# Patient Record
Sex: FEMALE | Race: WHITE
[De-identification: names, ages, dates, MRNs, and addresses within clinical notes are randomized per-mention and may not be internally consistent; named-entity substitution may affect disease eponyms.]

---

## 2021-01-19 ENCOUNTER — HOSPITAL ENCOUNTER (EMERGENCY)
Dept: HOSPITAL 63 - ER | Age: 71
Discharge: HOME | End: 2021-01-19
Payer: COMMERCIAL

## 2021-01-19 VITALS — BODY MASS INDEX: 27.48 KG/M2 | HEIGHT: 60 IN | WEIGHT: 139.99 LBS

## 2021-01-19 VITALS — DIASTOLIC BLOOD PRESSURE: 76 MMHG | SYSTOLIC BLOOD PRESSURE: 141 MMHG

## 2021-01-19 DIAGNOSIS — Z90.710: ICD-10-CM

## 2021-01-19 DIAGNOSIS — Z85.89: ICD-10-CM

## 2021-01-19 DIAGNOSIS — R10.32: Primary | ICD-10-CM

## 2021-01-19 DIAGNOSIS — Z90.49: ICD-10-CM

## 2021-01-19 DIAGNOSIS — Z88.2: ICD-10-CM

## 2021-01-19 DIAGNOSIS — R19.04: ICD-10-CM

## 2021-01-19 DIAGNOSIS — Z98.890: ICD-10-CM

## 2021-01-19 LAB
ALBUMIN SERPL-MCNC: 3.9 G/DL (ref 3.4–5)
ALBUMIN/GLOB SERPL: 1.1 {RATIO} (ref 1–1.7)
ALP SERPL-CCNC: 56 U/L (ref 46–116)
ALT SERPL-CCNC: 17 U/L (ref 14–59)
ANION GAP SERPL CALC-SCNC: 10 MMOL/L (ref 6–14)
APTT PPP: (no result) S
AST SERPL-CCNC: 14 U/L (ref 15–37)
BACTERIA #/AREA URNS HPF: (no result) /HPF
BASOPHILS # BLD AUTO: 0 X10^3/UL (ref 0–0.2)
BASOPHILS NFR BLD: 1 % (ref 0–3)
BILIRUB SERPL-MCNC: 0.5 MG/DL (ref 0.2–1)
BILIRUB UR QL STRIP: (no result)
BUN/CREAT SERPL: 19 (ref 6–20)
CA-I SERPL ISE-MCNC: 13 MG/DL (ref 7–20)
CALCIUM SERPL-MCNC: 9.1 MG/DL (ref 8.5–10.1)
CHLORIDE SERPL-SCNC: 102 MMOL/L (ref 98–107)
CO2 SERPL-SCNC: 26 MMOL/L (ref 21–32)
CREAT SERPL-MCNC: 0.7 MG/DL (ref 0.6–1)
EOSINOPHIL NFR BLD: 0 % (ref 0–3)
EOSINOPHIL NFR BLD: 0 X10^3/UL (ref 0–0.7)
ERYTHROCYTE [DISTWIDTH] IN BLOOD BY AUTOMATED COUNT: 13.7 % (ref 11.5–14.5)
FIBRINOGEN PPP-MCNC: CLEAR MG/DL
GFR SERPLBLD BASED ON 1.73 SQ M-ARVRAT: 83.2 ML/MIN
GLOBULIN SER-MCNC: 3.7 G/DL (ref 2.2–3.8)
GLUCOSE SERPL-MCNC: 109 MG/DL (ref 70–99)
GLUCOSE UR STRIP-MCNC: (no result) MG/DL
HCT VFR BLD CALC: 39.1 % (ref 36–47)
HGB BLD-MCNC: 13.1 G/DL (ref 12–15.5)
LIPASE: 63 U/L (ref 73–393)
LYMPHOCYTES # BLD: 2 X10^3/UL (ref 1–4.8)
LYMPHOCYTES NFR BLD AUTO: 26 % (ref 24–48)
MCH RBC QN AUTO: 29 PG (ref 25–35)
MCHC RBC AUTO-ENTMCNC: 34 G/DL (ref 31–37)
MCV RBC AUTO: 85 FL (ref 79–100)
MONO #: 0.6 X10^3/UL (ref 0–1.1)
MONOCYTES NFR BLD: 8 % (ref 0–9)
NEUT #: 5 X10^3UL (ref 1.8–7.7)
NEUTROPHILS NFR BLD AUTO: 65 % (ref 31–73)
NITRITE UR QL STRIP: (no result)
PLATELET # BLD AUTO: 335 X10^3/UL (ref 140–400)
POTASSIUM SERPL-SCNC: 3.9 MMOL/L (ref 3.5–5.1)
PROT SERPL-MCNC: 7.6 G/DL (ref 6.4–8.2)
RBC # BLD AUTO: 4.57 X10^6/UL (ref 3.5–5.4)
RBC #/AREA URNS HPF: (no result) /HPF (ref 0–2)
SODIUM SERPL-SCNC: 138 MMOL/L (ref 136–145)
SP GR UR STRIP: <=1.005
SQUAMOUS #/AREA URNS LPF: (no result) /LPF
UROBILINOGEN UR-MCNC: 0.2 MG/DL
WBC # BLD AUTO: 7.7 X10^3/UL (ref 4–11)
WBC #/AREA URNS HPF: (no result) /HPF (ref 0–4)

## 2021-01-19 PROCEDURE — 96360 HYDRATION IV INFUSION INIT: CPT

## 2021-01-19 PROCEDURE — 80053 COMPREHEN METABOLIC PANEL: CPT

## 2021-01-19 PROCEDURE — 36415 COLL VENOUS BLD VENIPUNCTURE: CPT

## 2021-01-19 PROCEDURE — 81001 URINALYSIS AUTO W/SCOPE: CPT

## 2021-01-19 PROCEDURE — 83690 ASSAY OF LIPASE: CPT

## 2021-01-19 PROCEDURE — 99285 EMERGENCY DEPT VISIT HI MDM: CPT

## 2021-01-19 PROCEDURE — 83605 ASSAY OF LACTIC ACID: CPT

## 2021-01-19 PROCEDURE — 85025 COMPLETE CBC W/AUTO DIFF WBC: CPT

## 2021-01-19 PROCEDURE — 74177 CT ABD & PELVIS W/CONTRAST: CPT

## 2021-01-19 NOTE — PHYS DOC
Past History


Past Medical History:  Other


Additional Past Medical Histor:  SEASONAL ALLERGIES


Past Surgical History:  Cholecystectomy, , Hysterectomy, Tonsillectomy,

Other


Additional Past Surgical Histo:  WISDOM TEETH


Alcohol Use:  Occasionally





Adult General


Chief Complaint


Chief Complaint:  ABDOMINAL PAIN





HPI


HPI





Patient is a 70-year-old female with a past medical history significant for 

endometrial cancer who presents to the emergency department via the urgent care 

for left lower quadrant abdominal pain and abnormal CT with mass in left lower 

quadrant.  Patient states she has had some left lower quadrant fullness and 

tenderness for several weeks now.  States that she went to an urgent care today,

had a CT scan and a mass was seen on CT and was directed to the ED.  States that

she has intermittent pain in the area, 5 out of 10, dull and achy in nature and 

feels like there is something they are pressing on her bladder and bowels.  De

nies any recent traumas, illnesses, fevers, chest pain, shortness of breath, 

nausea, vomiting, diarrhea, dysuria, hematuria or blood in the stool.





Review of Systems


Review of Systems


Review of systems otherwise unremarkable except what noted in HPI





Current Medications


Current Medications





Current Medications








 Medications


  (Trade)  Dose


 Ordered  Sig/Lexie  Start Time


 Stop Time Status Last Admin


Dose Admin


 


 Iohexol


  (Omnipaque 300


 Mg/ml)  75 ml  1X  ONCE  21 19:45


 21 19:59 DC 21 20:38


75 ML


 


 Lactated Ringer's  1,000 ml @ 


 1,000 mls/hr  1X  ONCE  21 21:15


 21 22:14  21 21:19


1,000 MLS/HR











Allergies


Allergies





Allergies








Coded Allergies Type Severity Reaction Last Updated Verified


 


  Sulfa (Sulfonamide Antibiotics) Allergy Unknown  21 Yes











Physical Exam


Physical Exam





Constitutional: Well developed, well nourished, no acute distress, non-toxic 

appearance. []


HENT: Normocephalic, atraumatic, oropharynx moist, no oral exudates, nose 

normal. []


Eyes: conjunctiva normal, no discharge. [] 


Neck: Normal range of motion, 


Cardiovascular:Heart rate regular rhythm, no murmur []


Lungs & Thorax:  Bilateral breath sounds clear to auscultation []


Abdomen: Bowel sounds normal, obvious firm mass in left lower quadrant that is 

tender to palpation.  The rest of the abdominal exam with no tenderness, rebound


Skin: Warm, dry, no erythema, no rash. [] 


Back: No tenderness, no CVA tenderness. [] 


Extremities: No tenderness, no cyanosis, no clubbing, ROM intact, no edema. [] 


Neurologic: Alert and oriented X 3, normal motor function, normal sensory 

function, no focal deficits noted. []


Psychologic: Affect normal, judgement normal, mood normal. []





Current Patient Data


Vital Signs





                                   Vital Signs








  Date Time  Temp Pulse Resp B/P (MAP) Pulse Ox O2 Delivery O2 Flow Rate FiO2


 


21 19:25 98.2 84 18 148/70 (96) 96 Room Air  








Lab Results





                                Laboratory Tests








Test


 21


19:34 21


20:00


 


Urine Collection Type Unknown   


 


Urine Color Straw   


 


Urine Clarity Clear   


 


Urine pH 5.5   


 


Urine Specific Gravity <=1.005   


 


Urine Protein


 Neg


(NEG-TRACE) 





 


Urine Glucose (UA)


 Neg mg/dL


(NEG) 





 


Urine Ketones (Stick)


 Neg mg/dL


(NEG) 





 


Urine Blood Small (NEG)   


 


Urine Nitrite Neg (NEG)   


 


Urine Bilirubin Neg (NEG)   


 


Urine Urobilinogen Dipstick


 0.2 mg/dL (0.2


mg/dL) 





 


Urine Leukocyte Esterase Neg (NEG)   


 


Urine RBC


 Occ /HPF (0-2)


 





 


Urine WBC


 Occ /HPF (0-4)


 





 


Urine Squamous Epithelial


Cells Occ /LPF  


 





 


Urine Bacteria


 Few /HPF


(0-FEW) 





 


White Blood Count


 


 7.7 x10^3/uL


(4.0-11.0)


 


Red Blood Count


 


 4.57 x10^6/uL


(3.50-5.40)


 


Hemoglobin


 


 13.1 g/dL


(12.0-15.5)


 


Hematocrit


 


 39.1 %


(36.0-47.0)


 


Mean Corpuscular Volume


 


 85 fL ()





 


Mean Corpuscular Hemoglobin  29 pg (25-35)  


 


Mean Corpuscular Hemoglobin


Concent 


 34 g/dL


(31-37)


 


Red Cell Distribution Width


 


 13.7 %


(11.5-14.5)


 


Platelet Count


 


 335 x10^3/uL


(140-400)


 


Neutrophils (%) (Auto)  65 % (31-73)  


 


Lymphocytes (%) (Auto)  26 % (24-48)  


 


Monocytes (%) (Auto)  8 % (0-9)  


 


Eosinophils (%) (Auto)  0 % (0-3)  


 


Basophils (%) (Auto)  1 % (0-3)  


 


Neutrophils # (Auto)


 


 5.0 x10^3uL


(1.8-7.7)


 


Lymphocytes # (Auto)


 


 2.0 x10^3/uL


(1.0-4.8)


 


Monocytes # (Auto)


 


 0.6 x10^3/uL


(0.0-1.1)


 


Eosinophils # (Auto)


 


 0.0 x10^3/uL


(0.0-0.7)


 


Basophils # (Auto)


 


 0.0 x10^3/uL


(0.0-0.2)


 


Sodium Level


 


 138 mmol/L


(136-145)


 


Potassium Level


 


 3.9 mmol/L


(3.5-5.1)


 


Chloride Level


 


 102 mmol/L


()


 


Carbon Dioxide Level


 


 26 mmol/L


(21-32)


 


Anion Gap  10 (6-14)  


 


Blood Urea Nitrogen


 


 13 mg/dL


(7-20)


 


Creatinine


 


 0.7 mg/dL


(0.6-1.0)


 


Estimated GFR


(Cockcroft-Gault) 


 83.2  





 


BUN/Creatinine Ratio  19 (6-20)  


 


Glucose Level


 


 109 mg/dL


(70-99)  H


 


Lactic Acid Level


 


 0.7 mmol/L


(0.4-2.0)


 


Calcium Level


 


 9.1 mg/dL


(8.5-10.1)


 


Total Bilirubin


 


 0.5 mg/dL


(0.2-1.0)


 


Aspartate Amino Transferase


(AST) 


 14 U/L (15-37)


L


 


Alanine Aminotransferase (ALT)


 


 17 U/L (14-59)





 


Alkaline Phosphatase


 


 56 U/L


()


 


Total Protein


 


 7.6 g/dL


(6.4-8.2)


 


Albumin


 


 3.9 g/dL


(3.4-5.0)


 


Albumin/Globulin Ratio  1.1 (1.0-1.7)  


 


Lipase


 


 63 U/L


()  L











EKG


EKG


[]





Radiology/Procedures


Radiology/Procedures


[]





Heart Score


Risk Factors:


Risk Factors:  DM, Current or recent (<one month) smoker, HTN, HLP, family 

history of CAD, obesity.


Risk Scores:


Risk Factors:  DM, Current or recent (<one month) smoker, HTN, HLP, family 

history of CAD, obesity.





Course & Med Decision Making


Course & Med Decision Making


Patient is a 70-year-old female presents with left lower quadrant pain and 

abnormal CT showing mass in left lower quadrant


Vital signs not concerning.  Physical exam noted above.  Patient offered pain 

and/or nausea medicine but stated right now she was doing okay and would wait.


Laboratory analysis not concerning.  CT noted above showing approximately 20 cm 

mass in the left lower quadrant with some debris in it suggesting fluid and/or 

hemorrhage.


Called [facility] and talk to the Gyn Onc physician, Dr. Montse Gastelum.  Discussed history, labs and imaging.  She recommended that 

since patient's vitals were good, hemoglobin and other labs were good that she 

would be safe to discharge home and follow-up with her first thing in the 

morning.  Gave contact information and advised patient call her office first 

thing in the morning to set up initial evaluation, treatment and further 

imaging.  Discussed these findings with the family who verbalized understanding,

 were very grateful and agreed with plan of discharge and follow-up.





[]





Dragon Disclaimer


Dragon Disclaimer


This electronic medical record was generated, in whole or in part, using a voice

 recognition dictation system.





Departure


Departure:


Impression:  


   Primary Impression:  


   Abdominal pain


   Additional Impression:  


   Abdominal mass


Disposition:  01 DC HOME SELF CARE/HOMELESS


Condition:  STABLE


Referrals:  


XIN DAMICO DO (PCP)


Patient Instructions:  Abdominal Pain





Additional Instructions:  


Please read the attached information.





After talking with the gynecological/oncology physician over at [facility] Dr. Montse Gastelum.  She advised that she call her office first thing in the 

morning at [phone] to set up an appointment as soon as possible for further

 evaluation, treatment and imaging.





Problem Qualifiers











NING MACKENZIE MD               2021 21:50

## 2021-01-19 NOTE — RAD
INDICATION: Reason: left lower quadrant pain  Hx: Lower mass Omni 300 75cc / Spl. Instructions:  / Hi
story: .  



COMPARISON: None.



TECHNIQUE:



Axial CT images obtained through the abdomen and pelvis with contrast.



One or more of the following individualized dose reduction techniques were utilized for this examinat
ion:  1. Automated exposure control;  2. Adjustment of the mA and/or kV according to patient size;  3
. Use of iterative reconstruction technique.



FINDINGS:



Small hiatal hernia.

Atherosclerotic disease throughout the abdominal aorta.

This includes both hard and soft plaque throughout.

No intrahepatic bile duct dilation. Postcholecystectomy changes.

No peripancreatic fluid collection.

Spleen is unremarkable.

Excreted contrast in the bilateral renal pelvis without significant hydronephrosis.

Urinary bladder is largely decompressed with some contrast seen within. Mass effect on the urinary bl
adder from a large heterogenous mass filling the pelvis and extending in the lower abdomen. This mass
 measures up to 193 x 135 x 171 mm and is of mixed density with some low density in some high density
 component.

There is some haziness the fat within the pelvis adjacent to the sigmoid colon and rectal region with
 areas of nodularity in the area. There is also some edema and nodularity at left pericolic color reg
ion. This masslike structure also abuts the cecum and causes mass effect on it. There is a rim of hig
h density along the anterior aspect of the mass measuring up to about 13 mm in thickness which could 
be secondary to some hemorrhage along the periphery.

Degenerative changes of the spine. Multilevel central canal and neural foraminal stenosis.



IMPRESSION:



*  Within the pelvis and lower abdomen there is a large heterogenous masslike structure identified wh
ich appears mixed density with predominant high density with some low density component within. Given
 the reported history of endometrial carcinoma status post removal the most likely cause would includ
e tumor recurrence with a component of hemorrhage likely contributing. Is difficult to tell how much 
of this structure is secondary to underlying mass and how much is secondary to hemorrhage given the l
ack of prior. Another possible workup option would include pelvic MRI with and without contrast if fu
rther information is needed.



*  Additionally there is some nodularity and fluid seen posterior to this mass and adjacent to the re
ctosigmoid region. There is some prominence the wall of the rectosigmoid region at this location. Thi
s could be secondary to some implants within the area related to the pelvic mass or some blood at the
 site with other causes such as inflammation to the rectosigmoid region or a rectosigmoid lesion not 
excluded given this finding.



*  Along the anterior aspect of this masslike structure there is some high density component seen whi
ch could be from some blood tracking along its anterior aspect. Report called to the ER at 9:10 PM on
 date of exam



Electronically signed by: Ciro Vigil MD (1/19/2021 9:18 PM) [id number]

## 2021-06-25 ENCOUNTER — HOSPITAL ENCOUNTER (OUTPATIENT)
Dept: HOSPITAL 63 - SURG | Age: 71
Discharge: HOME | End: 2021-06-25
Attending: INTERNAL MEDICINE
Payer: COMMERCIAL

## 2021-06-25 VITALS — SYSTOLIC BLOOD PRESSURE: 159 MMHG | DIASTOLIC BLOOD PRESSURE: 60 MMHG

## 2021-06-25 DIAGNOSIS — M19.90: ICD-10-CM

## 2021-06-25 DIAGNOSIS — Z88.2: ICD-10-CM

## 2021-06-25 DIAGNOSIS — Z90.710: ICD-10-CM

## 2021-06-25 DIAGNOSIS — Z90.49: ICD-10-CM

## 2021-06-25 DIAGNOSIS — D12.5: ICD-10-CM

## 2021-06-25 DIAGNOSIS — D12.0: ICD-10-CM

## 2021-06-25 DIAGNOSIS — Z79.82: ICD-10-CM

## 2021-06-25 DIAGNOSIS — E78.00: ICD-10-CM

## 2021-06-25 DIAGNOSIS — Z79.899: ICD-10-CM

## 2021-06-25 DIAGNOSIS — D12.4: ICD-10-CM

## 2021-06-25 DIAGNOSIS — Z12.11: Primary | ICD-10-CM

## 2021-06-25 DIAGNOSIS — Z85.89: ICD-10-CM

## 2021-06-25 DIAGNOSIS — Z98.890: ICD-10-CM

## 2021-06-25 PROCEDURE — 45385 COLONOSCOPY W/LESION REMOVAL: CPT

## 2021-06-25 PROCEDURE — 45380 COLONOSCOPY AND BIOPSY: CPT

## 2021-06-25 PROCEDURE — 45381 COLONOSCOPY SUBMUCOUS NJX: CPT

## 2021-06-25 PROCEDURE — 88305 TISSUE EXAM BY PATHOLOGIST: CPT

## 2021-06-29 NOTE — PATHOLOGY
[facility] Accession Number: [id number]

.                                                                01

Material submitted:                                        .

PART A: cecum - CECAL POLYP BIOPSY

PART B: colon - TRANSVERSE COLON POLYP. Modifiers: transverse

PART C: colon - DESCENDING COLON POLYP. Modifiers: descending

PART D: sigmoid colon - SIGMOID COLON POLYP

.                                                                01

Clinical history:                                          .

SCREENING COLONOSCOPY

COLONOSCOPY WITH POLYPECTOMY

COLONOSCOPY REF#[id number]

.                                                                02

**********************************************************************

Diagnosis:

A.  Colon biopsies, cecal polyps:

- Tubular adenomas.

.

B.  Colon biopsy, transverse colon polyp:

- Hyperplastic polyp.

.

C.  Colon biopsy, descending colon polyp:

- Tubular adenoma.

.

D.  Colon biopsies, sigmoid colon polyps:

- Tubular adenomas.

(JPM:bashir; 06/29/2021)

[facility] [place]  [address]

**********************************************************************

.                                                                02

Comment:

There is no high grade dysplasia or evidence of malignancy.

(JPM:bashir; 06/29/2021)

.                                                                02

Electronically signed:                                     .

Nomi Latif MD, Pathologist

NPI- [id number]

.                                                                01

Gross description:                                         .

A.  Received in formalin labeled "Billings, Magda and cecal polyp".

Received are 2 tan-brown soft tissue fragments ranging from 0.2-0.3 cm.

The specimen is entirely submitted in cassette A1.

.

B.  Received in formalin labeled "Billings, Magda and transverse colon

polyp".  Received is a tan-brown soft tissue fragment measuring 0.5 x 0.3

x 0.3 cm.  The specimen is entirely submitted in cassette B1.

.

C.  Received in formalin labeled "Billings, Magda and descending colon".

Received is a tan-brown soft tissue fragment measuring 0.3 x 0.3 x 0.2 cm.

 The specimen is entirely submitted in cassette C1.

.

D.  Received in formalin labeled "Billings, Magda and sigmoid colon

polyps".  Received are 3 tan-brown soft tissue fragments ranging from

0.3-0.7 cm.  The specimen is entirely submitted in cassette D1.([facility];

6/28/2021)

J/[facility]  06/29/2021  [address]43 [address]

.                                                                02

Pathologist provided ICD-10:

D12.0, K63.5, D12.4, D12.5

.                                                                02

CPT                                                        .

097679, 135584, 690091, 420005

Specimen Comment: A courtesy copy of this report has been sent to [phone]

Specimen Comment: Report sent to  / DR DAMICO

***Performed at:  01

   [facility]

   [address], [place]  [id number]

   MD Hamlet Freidman MD Phone:  [phone]

***Performed at:  02

   [facility]

   [address], [place]  [id number]

   MD Nomi Latif MD Phone:  [phone]